# Patient Record
Sex: MALE | Race: WHITE | NOT HISPANIC OR LATINO | URBAN - METROPOLITAN AREA
[De-identification: names, ages, dates, MRNs, and addresses within clinical notes are randomized per-mention and may not be internally consistent; named-entity substitution may affect disease eponyms.]

---

## 2024-06-20 ENCOUNTER — OFFICE VISIT (OUTPATIENT)
Dept: ORTHOPEDIC SURGERY | Facility: HOSPITAL | Age: 29
End: 2024-06-20
Payer: COMMERCIAL

## 2024-06-20 ENCOUNTER — HOSPITAL ENCOUNTER (OUTPATIENT)
Dept: RADIOLOGY | Facility: HOSPITAL | Age: 29
Discharge: HOME | End: 2024-06-20
Payer: COMMERCIAL

## 2024-06-20 VITALS — HEIGHT: 69 IN | WEIGHT: 256 LBS | BODY MASS INDEX: 37.92 KG/M2

## 2024-06-20 DIAGNOSIS — M79.672 LEFT FOOT PAIN: ICD-10-CM

## 2024-06-20 DIAGNOSIS — S90.32XA CONTUSION OF LEFT FOOT, INITIAL ENCOUNTER: Primary | ICD-10-CM

## 2024-06-20 PROCEDURE — 73630 X-RAY EXAM OF FOOT: CPT | Mod: LEFT SIDE | Performed by: RADIOLOGY

## 2024-06-20 PROCEDURE — 99213 OFFICE O/P EST LOW 20 MIN: CPT | Performed by: FAMILY MEDICINE

## 2024-06-20 PROCEDURE — 73630 X-RAY EXAM OF FOOT: CPT | Mod: LT

## 2024-06-20 PROCEDURE — 99203 OFFICE O/P NEW LOW 30 MIN: CPT | Performed by: FAMILY MEDICINE

## 2024-06-20 ASSESSMENT — PAIN - FUNCTIONAL ASSESSMENT: PAIN_FUNCTIONAL_ASSESSMENT: 0-10

## 2024-06-20 NOTE — PROGRESS NOTES
Orthopedic injury clinic    ** Please excuse any errors in grammar or translation related to this dictation. Voice recognition software was utilized to prepare this document. **    Assessment & Plan:    Dx: Left foot contusion    Thankfully, he did not suffer a fracture from the impact.  We discussed that he can wear the boot as tolerated and can come out of it once it is feeling good.  Recommend the following:  - Perform conservative management of the affected area, the mainstay of which being RICE which stands for Rest, Ice, Compress, Elevate.  - Ice the area for 20 minutes at a time up to 5 times daily. Ice reduces inflammation greatly, and this is your best asset. Ice the area after any use of the affected area to prevent further inflammation.   - Compress the area for support using a compressive device such as an Ace wrap.  This reduces strain and helps prevent swelling and increased inflammation.  - Elevate the area above the heart. This helps to reduce swelling and inflammation.  - He may take OTC NSAIDs as needed.    All questions answered and patient is agreeable to plan of care.      Chief complaint:    HPI:   at [place] presents with complaint of left foot pain.  Last night, was doing drywall with his father, and a bucket full of drywall mud fell onto his left foot.  Had immediate pain.   put him in a tall walking boot this morning.  He tried icing it last night, but it made it feel worse at the time.  States it feels slightly better today, but still very bothersome.  He is here to make sure it is not fractured.    Exam:  Left foot Exam:  Swelling and ecchymosis over the dorsal medial midfoot.  No plantar ecchymosis  TTP dorsal first metatarsal and associated soft tissue  No TTP Lisfranc  2+ DP, TP pulses  SILT distally    Results:  Radiographs of the left foot obtained today were reviewed independently interpreted as no acute fracture or dislocation.

## 2024-10-31 ENCOUNTER — OFFICE VISIT (OUTPATIENT)
Dept: PRIMARY CARE | Facility: CLINIC | Age: 29
End: 2024-10-31
Payer: COMMERCIAL

## 2024-10-31 VITALS — SYSTOLIC BLOOD PRESSURE: 121 MMHG | DIASTOLIC BLOOD PRESSURE: 71 MMHG

## 2024-10-31 DIAGNOSIS — N50.82 SCROTAL PAIN: ICD-10-CM

## 2024-10-31 DIAGNOSIS — K21.9 GASTROESOPHAGEAL REFLUX DISEASE WITHOUT ESOPHAGITIS: Primary | ICD-10-CM

## 2024-10-31 PROCEDURE — 99203 OFFICE O/P NEW LOW 30 MIN: CPT | Performed by: INTERNAL MEDICINE

## 2025-04-25 ENCOUNTER — APPOINTMENT (OUTPATIENT)
Dept: OPHTHALMOLOGY | Facility: CLINIC | Age: 30
End: 2025-04-25
Payer: COMMERCIAL

## 2025-04-25 DIAGNOSIS — H52.03 HYPEROPIA OF BOTH EYES WITH ASTIGMATISM: Primary | ICD-10-CM

## 2025-04-25 DIAGNOSIS — H52.203 HYPEROPIA OF BOTH EYES WITH ASTIGMATISM: Primary | ICD-10-CM

## 2025-04-25 PROCEDURE — 92015 DETERMINE REFRACTIVE STATE: CPT | Performed by: OPTOMETRIST

## 2025-04-25 PROCEDURE — 92004 COMPRE OPH EXAM NEW PT 1/>: CPT | Performed by: OPTOMETRIST

## 2025-04-25 ASSESSMENT — CONF VISUAL FIELD
OS_SUPERIOR_TEMPORAL_RESTRICTION: 0
OS_INFERIOR_NASAL_RESTRICTION: 0
OS_NORMAL: 1
OS_SUPERIOR_NASAL_RESTRICTION: 0
OS_INFERIOR_TEMPORAL_RESTRICTION: 0

## 2025-04-25 ASSESSMENT — REFRACTION_MANIFEST
OD_CYLINDER: -2.00
OS_AXIS: 080
OS_CYLINDER: -1.25
OD_AXIS: 095
OS_CYLINDER: -1.00
OD_SPHERE: +0.75
OS_SPHERE: +1.00
OD_CYLINDER: -2.00
OS_AXIS: 080
OD_SPHERE: +0.75
METHOD_AUTOREFRACTION: 1
OS_SPHERE: +1.00
OS_CYLINDER: -1.25
OD_CYLINDER: -2.00
OS_AXIS: 080
OD_AXIS: 100
OS_SPHERE: +1.25
OD_AXIS: 095
OD_SPHERE: +0.75

## 2025-04-25 ASSESSMENT — EXTERNAL EXAM - LEFT EYE: OS_EXAM: NORMAL

## 2025-04-25 ASSESSMENT — VISUAL ACUITY
OD_SC: 20/40
OS_SC: 20/20-2
OD_PH_SC: 20/20-2
METHOD: SNELLEN - LINEAR

## 2025-04-25 ASSESSMENT — TONOMETRY
IOP_METHOD: GOLDMANN APPLANATION
OS_IOP_MMHG: 18
OD_IOP_MMHG: 18

## 2025-04-25 ASSESSMENT — SLIT LAMP EXAM - LIDS
COMMENTS: GOOD POSITION
COMMENTS: GOOD POSITION

## 2025-04-25 ASSESSMENT — CUP TO DISC RATIO
OS_RATIO: .3
OD_RATIO: .3

## 2025-04-25 ASSESSMENT — EXTERNAL EXAM - RIGHT EYE: OD_EXAM: NORMAL

## 2025-04-25 NOTE — ASSESSMENT & PLAN NOTE
Moderate Rx. Anterior and posterior ocular health WNL OU.  Pt educated on findings. Release Rx for full time wear. Discussed adaptation. Discussed can consider CLRx after few months of getting used to [facility]. Monitor annually. Pt voiced understanding.

## 2025-04-25 NOTE — PROGRESS NOTES
Assessment/Plan   Problem List Items Addressed This Visit       Hyperopia of both eyes with astigmatism - Primary    Moderate Rx. Anterior and posterior ocular health WNL OU.  Pt educated on findings. Release Rx for full time wear. Discussed adaptation. Discussed can consider CLRx after few months of getting used to [facility]. Monitor annually. Pt voiced understanding.